# Patient Record
Sex: MALE | HISPANIC OR LATINO | URBAN - METROPOLITAN AREA
[De-identification: names, ages, dates, MRNs, and addresses within clinical notes are randomized per-mention and may not be internally consistent; named-entity substitution may affect disease eponyms.]

---

## 2024-05-12 ENCOUNTER — APPOINTMENT (OUTPATIENT)
Dept: RADIOLOGY | Facility: HOSPITAL | Age: 44
End: 2024-05-12

## 2024-05-12 ENCOUNTER — HOSPITAL ENCOUNTER (EMERGENCY)
Facility: HOSPITAL | Age: 44
Discharge: HOME | End: 2024-05-12
Attending: STUDENT IN AN ORGANIZED HEALTH CARE EDUCATION/TRAINING PROGRAM

## 2024-05-12 VITALS
SYSTOLIC BLOOD PRESSURE: 138 MMHG | OXYGEN SATURATION: 98 % | BODY MASS INDEX: 26.51 KG/M2 | RESPIRATION RATE: 16 BRPM | DIASTOLIC BLOOD PRESSURE: 69 MMHG | TEMPERATURE: 97.3 F | HEIGHT: 73 IN | HEART RATE: 74 BPM | WEIGHT: 200 LBS

## 2024-05-12 DIAGNOSIS — M25.572 ACUTE LEFT ANKLE PAIN: Primary | ICD-10-CM

## 2024-05-12 PROCEDURE — 73610 X-RAY EXAM OF ANKLE: CPT | Mod: LT

## 2024-05-12 PROCEDURE — 99284 EMERGENCY DEPT VISIT MOD MDM: CPT | Performed by: STUDENT IN AN ORGANIZED HEALTH CARE EDUCATION/TRAINING PROGRAM

## 2024-05-12 PROCEDURE — 73610 X-RAY EXAM OF ANKLE: CPT | Mod: LEFT SIDE | Performed by: RADIOLOGY

## 2024-05-12 PROCEDURE — 99284 EMERGENCY DEPT VISIT MOD MDM: CPT

## 2024-05-12 PROCEDURE — 73630 X-RAY EXAM OF FOOT: CPT | Mod: LEFT SIDE | Performed by: RADIOLOGY

## 2024-05-12 PROCEDURE — 73630 X-RAY EXAM OF FOOT: CPT | Mod: LT

## 2024-05-12 ASSESSMENT — COLUMBIA-SUICIDE SEVERITY RATING SCALE - C-SSRS
1. IN THE PAST MONTH, HAVE YOU WISHED YOU WERE DEAD OR WISHED YOU COULD GO TO SLEEP AND NOT WAKE UP?: NO
6. HAVE YOU EVER DONE ANYTHING, STARTED TO DO ANYTHING, OR PREPARED TO DO ANYTHING TO END YOUR LIFE?: NO
2. HAVE YOU ACTUALLY HAD ANY THOUGHTS OF KILLING YOURSELF?: NO

## 2024-05-12 ASSESSMENT — LIFESTYLE VARIABLES
HAVE YOU EVER FELT YOU SHOULD CUT DOWN ON YOUR DRINKING: NO
TOTAL SCORE: 0
HAVE PEOPLE ANNOYED YOU BY CRITICIZING YOUR DRINKING: NO
EVER FELT BAD OR GUILTY ABOUT YOUR DRINKING: NO
EVER HAD A DRINK FIRST THING IN THE MORNING TO STEADY YOUR NERVES TO GET RID OF A HANGOVER: NO

## 2024-05-12 ASSESSMENT — PAIN SCALES - GENERAL: PAINLEVEL_OUTOF10: 8

## 2024-05-12 ASSESSMENT — PAIN - FUNCTIONAL ASSESSMENT: PAIN_FUNCTIONAL_ASSESSMENT: 0-10

## 2024-05-13 NOTE — ED PROVIDER NOTES
EMERGENCY DEPARTMENT ENCOUNTER      Pt Name: Tanya Álvarez  MRN: [id number]  Birthdate 1980  Date of evaluation: 5/12/2024  Provider: Jay Avendano DO    CHIEF COMPLAINT       Chief Complaint   Patient presents with    Ankle Pain     L ankle swelling/pain x1 week; pt denies any falls/trauma.      HISTORY OF PRESENT ILLNESS    Tanya Álvarez is a 44 y.o. year old male who presents to the ER for left foot pain.  He reports pain at the back of his left heel.  Denies numbness, tingling, overt injury.  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  No new complaints  No respite complaints  No calf swelling  No recent travel  No history blood clot    He has been ongoing for approximately 5 days, intermittent.  Allergies Aleve-anaphylaxis       PAST MEDICAL HISTORY   History reviewed. No pertinent past medical history.  CURRENT MEDICATIONS       Previous Medications    No medications on file     SURGICAL HISTORY     History reviewed. No pertinent surgical history.  ALLERGIES     Aleve [naproxen sodium]  FAMILY HISTORY     No family history on file.  SOCIAL HISTORY       Social History     Tobacco Use    Smoking status: Every Day     Types: Cigarettes    Smokeless tobacco: Never   Vaping Use    Vaping status: Never Used   Substance Use Topics    Alcohol use: Yes     Comment: socially    Drug use: Never     PHYSICAL EXAM  (up to 7 for level 4, 8 or more for level 5)     ED Triage Vitals [05/12/24 2240]   Temperature Heart Rate Resp BP   36.3 °C (97.3 °F) 79 -- 146/66      Pulse Ox Temp Source Heart Rate Source Patient Position   97 % Temporal -- --      BP Location FiO2 (%)     -- --       Physical Exam  Vitals and nursing note reviewed.   Constitutional:       General: He is not in acute distress.     Appearance: Normal appearance. He is not ill-appearing.   HENT:      Head: Normocephalic and atraumatic. No raccoon eyes, Bethea's sign, contusion or laceration.      Jaw: No trismus or malocclusion.      Right  Ear: External ear normal.      Left Ear: External ear normal.      Mouth/Throat:      Mouth: Mucous membranes are moist.      Pharynx: Oropharynx is clear.   Eyes:      Extraocular Movements: Extraocular movements intact.      Pupils: Pupils are equal, round, and reactive to light.   Neck:      Trachea: No tracheal deviation.   Cardiovascular:      Rate and Rhythm: Normal rate and regular rhythm.      Pulses: Normal pulses.   Pulmonary:      Effort: No respiratory distress.      Breath sounds: No wheezing, rhonchi or rales.   Chest:      Chest wall: No tenderness.   Abdominal:      General: Abdomen is flat.      Palpations: Abdomen is soft. There is no mass.      Tenderness: There is no abdominal tenderness.   Musculoskeletal:         General: No tenderness or signs of injury.      Cervical back: Normal range of motion. No tenderness.      Right lower leg: No edema.      Left lower leg: No edema.        Feet:    Feet:      Comments: Point tenderness to achilles insertion. Nonttp at b/l malleoli, no decreased ROM. No pain at 5th MT  Skin:     Coloration: Skin is not jaundiced or pale.      Findings: No petechiae, rash or wound.   Neurological:      General: No focal deficit present.      Mental Status: He is alert.   Psychiatric:         Speech: Speech normal.         Thought Content: Thought content does not include homicidal or suicidal ideation.        DIAGNOSTIC RESULTS   LABS:  Labs Reviewed - No data to display  All other labs were within normal range or not returned as of this dictation.  Imaging  XR ankle left 3+ views   Final Result   No acute fracture. Soft tissue swelling overlying the lateral   malleolus.             MACRO:   None        Signed by: Carl Mehta 5/12/2024 11:32 PM   Dictation workstation:   [id number]      XR foot left 3+ views   Final Result   No acute fracture. Soft tissue swelling overlying the lateral   malleolus.             MACRO:   None        Signed by: Carl Mehta 5/12/2024  "11:32 PM   Dictation workstation:   [id number]         Procedure  Procedures  EMERGENCY DEPARTMENT COURSE/MDM:   Medical Decision Making    Vitals:    Vitals:    05/12/24 2240   BP: 146/66   Pulse: 79   Temp: 36.3 °C (97.3 °F)   TempSrc: Temporal   SpO2: 97%   Weight: 90.7 kg (200 lb)   Height: 1.854 m (6' 1\")     Tanya Álvarez is a male 44 y.o. who presents to the ER for L foot pain. On arrival the patients vital signs were: Afebrile, Bradycardic, Normotensive, Regular RR, and Oxygenating well on room air. History obtained from: patient. Given foot pain, plan for imaging to assess for underlying fracture.    ED Course as of 05/12/24 2339   Sun May 12, 2024   2339 XR ankle left 3+ views  No acute fracture. Soft tissue swelling overlying the lateral  malleolus.   [CB]      ED Course User Index  [CB] Jay Avendano DO         Diagnoses as of 05/12/24 2339   Acute left ankle pain     External Records Reviewed: I reviewed recent and relevant outside records including inpatient notes, outpatient records    Shared decision making for disposition  Patient and/or patient´s representative was counseled regarding labs, imaging, likely diagnosis. All questions were answered. Recommendation was made   for discharge home. The patient agreed and was discharged home in stable condition with appropriate relevant educational materials.       Follow-up:  Coby Castro DPM  [id number] [facility] [address]  [place] [id number] [phone]    Call in 2 days  Follow-up with podiatry in the next 48 hours.  Return to ED if experiencing worsening symptoms, pain, redness, swelling, leg pain, calf pain, chest pain, shortness of breath, weakness, numbness, tingling, limitation range of motion, As needed        Final Impression:   1. Acute left ankle pain          Please excuse any misspellings or unintended errors related to the Dragon speech recognition software used to dictate this note.    I reviewed the case with the attending ED " physician. The attending ED physician agrees with the plan.      Jay Avendano DO  Resident  05/12/24 0471       DO Esperanza Lopez  05/12/24 3814